# Patient Record
Sex: MALE | Race: WHITE | URBAN - METROPOLITAN AREA
[De-identification: names, ages, dates, MRNs, and addresses within clinical notes are randomized per-mention and may not be internally consistent; named-entity substitution may affect disease eponyms.]

---

## 2021-09-27 ENCOUNTER — LAB OUTSIDE AN ENCOUNTER (OUTPATIENT)
Dept: URBAN - METROPOLITAN AREA CLINIC 40 | Facility: CLINIC | Age: 46
End: 2021-09-27

## 2021-09-27 ENCOUNTER — WEB ENCOUNTER (OUTPATIENT)
Dept: URBAN - METROPOLITAN AREA CLINIC 40 | Facility: CLINIC | Age: 46
End: 2021-09-27

## 2021-09-27 ENCOUNTER — OFFICE VISIT (OUTPATIENT)
Dept: URBAN - METROPOLITAN AREA CLINIC 40 | Facility: CLINIC | Age: 46
End: 2021-09-27
Payer: COMMERCIAL

## 2021-09-27 DIAGNOSIS — K64.5 THROMBOSED HEMORRHOIDS: ICD-10-CM

## 2021-09-27 DIAGNOSIS — R19.5 MUCUS IN STOOL: ICD-10-CM

## 2021-09-27 DIAGNOSIS — R14.0 BLOATING: ICD-10-CM

## 2021-09-27 DIAGNOSIS — R19.7 DIARRHEA, UNSPECIFIED TYPE: ICD-10-CM

## 2021-09-27 PROCEDURE — 99214 OFFICE O/P EST MOD 30 MIN: CPT | Performed by: INTERNAL MEDICINE

## 2021-09-27 RX ORDER — NAPROXEN SODIUM 220 MG
TABLET ORAL
Qty: 0 | Refills: 0 | Status: ACTIVE | COMMUNITY
Start: 1900-01-01

## 2021-09-27 RX ORDER — DEXTROAMPHETAMINE SULFATE, DEXTROAMPHETAMINE SACCHARATE, AMPHETAMINE SULFATE AND AMPHETAMINE ASPARTATE 7.5; 7.5; 7.5; 7.5 MG/1; MG/1; MG/1; MG/1
CAPSULE, EXTENDED RELEASE ORAL
Qty: 0 | Refills: 0 | Status: ACTIVE | COMMUNITY
Start: 1900-01-01

## 2021-09-27 RX ORDER — HYDROCORTISONE ACETATE 25 MG/1
1 SUPPOSITORY SUPPOSITORY RECTAL TWICE A DAY
Qty: 20 | Refills: 2 | OUTPATIENT
Start: 2021-09-27 | End: 2021-10-27

## 2021-09-27 RX ORDER — DICYCLOMINE HYDROCHLORIDE 20 MG/1
1 TABLET TABLET ORAL THREE TIMES A DAY
Qty: 270 TABLET | Refills: 3 | OUTPATIENT
Start: 2021-09-27 | End: 2022-09-22

## 2021-09-27 RX ORDER — IBUPROFEN 200 MG/1
TABLET, COATED ORAL
Qty: 0 | Refills: 0 | Status: ACTIVE | COMMUNITY
Start: 1900-01-01

## 2021-09-27 NOTE — HPI-TODAY'S VISIT:
Patient presents for evaluation of acute abdominal pain.  He had colonoscopy 2019 that was negative for polyps and inflammatory bowel disease.  At that time he had some abdominal discomfort but HIDA scan and colonoscopy were otherwise normal.  He had hemorrhoid banding x3 in 2019 he is doing well in that regard.  CT scan 2019 at [facility] was negative, no acute abnormality, mild DJD noted in the back. Since our last visit he has noticed a few episodes of moderate abdominal cramping, mucus stools, fecal hurry and occasional diarrhea.  No specific alleviating or aggravating factors.  His wife who is a physician's assistant checked labs which included a negative stool culture, negative C. difficile, but there was an elevated fecal calprotectin at 285, his Aska was also positive on IBD panel.  He was then diagnosed with bronchiectasis from a pulmonologist.  There was question of whether or not he may have Crohn's disease.  In the meantime he had a thrombosed external hemorrhoid about a week ago which was large and tender to touch and hard to touch.  This is improved.  He does struggle with ongoing hemorrhoids with occasional white bleeding and pain itching and burning.

## 2021-09-27 NOTE — PHYSICAL EXAM GASTROINTESTINAL
Abdomen , soft, nontender, nondistended , no guarding or rigidity , no masses palpable , normal bowel sounds , Liver and Spleen , no hepatomegaly present , no hepatosplenomegaly , liver nontender , spleen not palpable , Rectal , resolving thrombosed hemorrhoid LL, normal sphincter tone , internal hemorrhoids, no rectal masses or bleeding present

## 2021-10-05 ENCOUNTER — WEB ENCOUNTER (OUTPATIENT)
Dept: URBAN - METROPOLITAN AREA CLINIC 40 | Facility: CLINIC | Age: 46
End: 2021-10-05

## 2021-10-06 ENCOUNTER — WEB ENCOUNTER (OUTPATIENT)
Dept: URBAN - METROPOLITAN AREA CLINIC 40 | Facility: CLINIC | Age: 46
End: 2021-10-06

## 2021-10-13 ENCOUNTER — WEB ENCOUNTER (OUTPATIENT)
Dept: URBAN - METROPOLITAN AREA CLINIC 40 | Facility: CLINIC | Age: 46
End: 2021-10-13

## 2021-10-25 ENCOUNTER — TELEPHONE ENCOUNTER (OUTPATIENT)
Dept: URBAN - METROPOLITAN AREA CLINIC 40 | Facility: CLINIC | Age: 46
End: 2021-10-25

## 2022-11-16 ENCOUNTER — OFFICE VISIT (OUTPATIENT)
Dept: URBAN - METROPOLITAN AREA CLINIC 74 | Facility: CLINIC | Age: 47
End: 2022-11-16
Payer: COMMERCIAL

## 2022-11-16 VITALS
HEART RATE: 103 BPM | OXYGEN SATURATION: 93 % | WEIGHT: 177.6 LBS | SYSTOLIC BLOOD PRESSURE: 128 MMHG | DIASTOLIC BLOOD PRESSURE: 82 MMHG | TEMPERATURE: 98.2 F | BODY MASS INDEX: 25.43 KG/M2 | HEIGHT: 70 IN

## 2022-11-16 DIAGNOSIS — K59.01 CONSTIPATION BY DELAYED COLONIC TRANSIT: ICD-10-CM

## 2022-11-16 DIAGNOSIS — R19.5 MUCUS IN STOOL: ICD-10-CM

## 2022-11-16 DIAGNOSIS — K64.1 GRADE II HEMORRHOIDS: ICD-10-CM

## 2022-11-16 DIAGNOSIS — K58.2 MIXED IRRITABLE BOWEL SYNDROME: ICD-10-CM

## 2022-11-16 DIAGNOSIS — R14.0 BLOATING: ICD-10-CM

## 2022-11-16 PROCEDURE — 99213 OFFICE O/P EST LOW 20 MIN: CPT | Performed by: INTERNAL MEDICINE

## 2022-11-16 RX ORDER — DICYCLOMINE HYDROCHLORIDE 20 MG/1
1 TABLET TABLET ORAL THREE TIMES A DAY
Qty: 270 TABLET | Refills: 3 | OUTPATIENT
Start: 2022-11-16 | End: 2023-11-11

## 2022-11-16 RX ORDER — IBUPROFEN 200 MG/1
TABLET, COATED ORAL
Qty: 0 | Refills: 0 | Status: ACTIVE | COMMUNITY
Start: 1900-01-01

## 2022-11-16 RX ORDER — LUBIPROSTONE 8 UG/1
1 CAPSULE WITH FOOD AND WATER CAPSULE, GELATIN COATED ORAL TWICE A DAY
Qty: 180 CAPSULE | Refills: 3 | OUTPATIENT
Start: 2022-11-16 | End: 2023-11-11

## 2022-11-16 RX ORDER — DEXTROAMPHETAMINE SULFATE, DEXTROAMPHETAMINE SACCHARATE, AMPHETAMINE SULFATE AND AMPHETAMINE ASPARTATE 7.5; 7.5; 7.5; 7.5 MG/1; MG/1; MG/1; MG/1
CAPSULE, EXTENDED RELEASE ORAL
Qty: 0 | Refills: 0 | Status: ACTIVE | COMMUNITY
Start: 1900-01-01

## 2022-11-16 RX ORDER — NAPROXEN SODIUM 220 MG
TABLET ORAL
Qty: 0 | Refills: 0 | Status: ACTIVE | COMMUNITY
Start: 1900-01-01

## 2022-11-16 NOTE — HPI-TODAY'S VISIT:
Patient presents for annual visit and evaluation and management of IBS and chronic intermittent abdominal pain. Since last visit he has ongoing constipation, straining to pass small stool, mucus in stools, frequent bloating and cramps. Linzess prn helps, but does cause diarrhea. He had additional stool tests done recently, but I do not have records to review. -MRe negative 2021. He had colonoscopy 2019 that was negative for polyps and inflammatory bowel disease.  At that time he had some abdominal discomfort but HIDA scan and colonoscopy were otherwise normal.  He had hemorrhoid banding x3 in 2019 he is doing well in that regard.  CT scan 2019 at [facility] was negative, no acute abnormality, mild DJD noted in the back. Since our last visit he has noticed a few episodes of moderate abdominal cramping, mucus stools, fecal hurry and occasional diarrhea.  No specific alleviating or aggravating factors.  His wife who is a physician's assistant checked labs which included a negative stool culture, negative C. difficile, but there was an elevated fecal calprotectin at 285, his Aska was also positive on IBD panel.  He was then diagnosed with bronchiectasis from a pulmonologist.  There was question of whether or not he may have Crohn's disease.  He does struggle with ongoing hemorrhoids with occasional white bleeding and pain itching and burning.  ===== EXAM:  AIC MRE (ABDOMEN AND PELVIS W+W/O IV CONTRAST)(CREATININE DRAW IF NEEDED)   CLINICAL INDICATION:  R14.0 (Abdominal distension (gaseous))    TECHNIQUE:  Multiplanar multisequence images were obtained through the abdomen and pelvis before and after IV administration of 8 mL of Multihance. Patient received oral contrast as well and was scanned utilizing MR enterography protocol.   COMPARISON:  No comparisons are available at this time.   FINDINGS:     MR ENTEROGRAPHY:    Bowel: There are no segments of bowel wall thickening or hyperenhancement to indicate active inflammatory bowel disease. No disproportionate dilation of the small or large bowel. There is no stricture, fistula, sinus tract, or abscess identified.  Motility appears normal.   REMAINDER OF THE ABDOMEN AND PELVIS:     Liver: Normal in size and configuration. No suspicious mass.   Bile ducts: No intrahepatic or extrahepatic bile duct dilation.   Gallbladder: No gallstones. Normal wall thickness.   Pancreas: Normal. No main pancreatic duct dilation.   Spleen: Normal.   Adrenals: Normal.   Kidneys: No suspicious renal mass. No hydronephrosis.   Bladder: Normal.   Reproductive organs: Normal size prostate. Symmetric seminal vesicles.   Peritoneum/Retroperitoneum: No ascites.   Lymph nodes: No enlarged lymph nodes.    Vasculature: No abdominal aortic aneurysm.   Bones: No suspicious osseous lesion.   IMPRESSION: .         .   No findings of active inflammatory bowel disease.   Released By: CASSIA SAVAGE MD 11/3/2021 12:25 PM

## 2023-01-23 ENCOUNTER — OFFICE VISIT (OUTPATIENT)
Dept: URBAN - METROPOLITAN AREA CLINIC 74 | Facility: CLINIC | Age: 48
End: 2023-01-23
Payer: COMMERCIAL

## 2023-01-23 ENCOUNTER — LAB OUTSIDE AN ENCOUNTER (OUTPATIENT)
Dept: URBAN - METROPOLITAN AREA CLINIC 74 | Facility: CLINIC | Age: 48
End: 2023-01-23

## 2023-01-23 VITALS
OXYGEN SATURATION: 100 % | SYSTOLIC BLOOD PRESSURE: 118 MMHG | TEMPERATURE: 97.9 F | BODY MASS INDEX: 25.4 KG/M2 | HEART RATE: 84 BPM | WEIGHT: 177.4 LBS | HEIGHT: 70 IN | DIASTOLIC BLOOD PRESSURE: 74 MMHG

## 2023-01-23 DIAGNOSIS — R19.5 MUCUS IN STOOL: ICD-10-CM

## 2023-01-23 DIAGNOSIS — K58.2 MIXED IRRITABLE BOWEL SYNDROME: ICD-10-CM

## 2023-01-23 DIAGNOSIS — K64.1 GRADE II HEMORRHOIDS: ICD-10-CM

## 2023-01-23 DIAGNOSIS — R14.0 BLOATING: ICD-10-CM

## 2023-01-23 DIAGNOSIS — K59.01 CONSTIPATION BY DELAYED COLONIC TRANSIT: ICD-10-CM

## 2023-01-23 PROCEDURE — 99214 OFFICE O/P EST MOD 30 MIN: CPT | Performed by: INTERNAL MEDICINE

## 2023-01-23 RX ORDER — LUBIPROSTONE 8 UG/1
1 CAPSULE WITH FOOD AND WATER CAPSULE, GELATIN COATED ORAL TWICE A DAY
Qty: 180 CAPSULE | Refills: 3 | OUTPATIENT

## 2023-01-23 RX ORDER — DICYCLOMINE HYDROCHLORIDE 20 MG/1
1 TABLET TABLET ORAL THREE TIMES A DAY
Qty: 270 TABLET | Refills: 3 | Status: ACTIVE | COMMUNITY
Start: 2022-11-16 | End: 2023-11-11

## 2023-01-23 RX ORDER — DEXTROAMPHETAMINE SULFATE, DEXTROAMPHETAMINE SACCHARATE, AMPHETAMINE SULFATE AND AMPHETAMINE ASPARTATE 7.5; 7.5; 7.5; 7.5 MG/1; MG/1; MG/1; MG/1
CAPSULE, EXTENDED RELEASE ORAL
Qty: 0 | Refills: 0 | Status: ACTIVE | COMMUNITY
Start: 1900-01-01

## 2023-01-23 RX ORDER — IBUPROFEN 200 MG/1
TABLET, COATED ORAL
Qty: 0 | Refills: 0 | Status: ACTIVE | COMMUNITY
Start: 1900-01-01

## 2023-01-23 RX ORDER — LUBIPROSTONE 8 UG/1
1 CAPSULE WITH FOOD AND WATER CAPSULE, GELATIN COATED ORAL TWICE A DAY
Qty: 180 CAPSULE | Refills: 3 | Status: ACTIVE | COMMUNITY
Start: 2022-11-16 | End: 2023-11-11

## 2023-01-23 RX ORDER — NAPROXEN SODIUM 220 MG
TABLET ORAL
Qty: 0 | Refills: 0 | Status: ACTIVE | COMMUNITY
Start: 1900-01-01

## 2023-01-23 RX ORDER — DICYCLOMINE HYDROCHLORIDE 20 MG/1
1 TABLET TABLET ORAL THREE TIMES A DAY
Qty: 270 TABLET | Refills: 3 | OUTPATIENT

## 2023-01-23 NOTE — HPI-TODAY'S VISIT:
Follow up visit.  Evaluation and management of suspected IBS and functional pain. Last visit: started Lubiprostone and Dicyclomine. Also, sample of Xifaxan. He c/o ongoing IBS s/s and bloating, no a/a factors, similar to prior. Hemorrhoids, flare, p/b/i. Linzess prn works, but he get diarrhea. Amitiza did not have response. He is concerned there is an infection of the gut.  Bloating often. No red flag s/s, no n/v.   -------- Prior note: Patient presents for annual visit and evaluation and management of IBS and chronic intermittent abdominal pain. Since last visit he has ongoing constipation, straining to pass small stool, mucus in stools, frequent bloating and cramps. Linzess prn helps, but does cause diarrhea. He had additional stool tests done recently, but I do not have records to review. -MRe negative 2021. He had colonoscopy 2019 that was negative for polyps and inflammatory bowel disease.  At that time he had some abdominal discomfort but HIDA scan and colonoscopy were otherwise normal.  He had hemorrhoid banding x3 in 2019 he is doing well in that regard.  CT scan 2019 at [facility] was negative, no acute abnormality, mild DJD noted in the back. Since our last visit he has noticed a few episodes of moderate abdominal cramping, mucus stools, fecal hurry and occasional diarrhea.  No specific alleviating or aggravating factors.  His wife who is a physician's assistant checked labs which included a negative stool culture, negative C. difficile, but there was an elevated fecal calprotectin at 285, his Aska was also positive on IBD panel.  He was then diagnosed with bronchiectasis from a pulmonologist.  There was question of whether or not he may have Crohn's disease.  He does struggle with ongoing hemorrhoids with occasional white bleeding and pain itching and burning.  ===== EXAM:  AIC MRE (ABDOMEN AND PELVIS W+W/O IV CONTRAST)(CREATININE DRAW IF NEEDED)   CLINICAL INDICATION:  R14.0 (Abdominal distension (gaseous))    TECHNIQUE:  Multiplanar multisequence images were obtained through the abdomen and pelvis before and after IV administration of 8 mL of Multihance. Patient received oral contrast as well and was scanned utilizing MR enterography protocol.   COMPARISON:  No comparisons are available at this time.   FINDINGS:     MR ENTEROGRAPHY:    Bowel: There are no segments of bowel wall thickening or hyperenhancement to indicate active inflammatory bowel disease. No disproportionate dilation of the small or large bowel. There is no stricture, fistula, sinus tract, or abscess identified.  Motility appears normal.   REMAINDER OF THE ABDOMEN AND PELVIS:     Liver: Normal in size and configuration. No suspicious mass.   Bile ducts: No intrahepatic or extrahepatic bile duct dilation.   Gallbladder: No gallstones. Normal wall thickness.   Pancreas: Normal. No main pancreatic duct dilation.   Spleen: Normal.   Adrenals: Normal.   Kidneys: No suspicious renal mass. No hydronephrosis.   Bladder: Normal.   Reproductive organs: Normal size prostate. Symmetric seminal vesicles.   Peritoneum/Retroperitoneum: No ascites.   Lymph nodes: No enlarged lymph nodes.    Vasculature: No abdominal aortic aneurysm.   Bones: No suspicious osseous lesion.   IMPRESSION: .         .   No findings of active inflammatory bowel disease.   Released By: CASSIA SAVAGE MD 11/3/2021 12:25 PM

## 2023-02-13 PROBLEM — 35298007: Status: ACTIVE | Noted: 2022-11-16

## 2023-02-13 PROBLEM — 440544005: Status: ACTIVE | Noted: 2022-11-16

## 2023-02-28 ENCOUNTER — OFFICE VISIT (OUTPATIENT)
Dept: URBAN - METROPOLITAN AREA SURGERY CENTER 30 | Facility: SURGERY CENTER | Age: 48
End: 2023-02-28
Payer: COMMERCIAL

## 2023-02-28 ENCOUNTER — CLAIMS CREATED FROM THE CLAIM WINDOW (OUTPATIENT)
Dept: URBAN - METROPOLITAN AREA CLINIC 4 | Facility: CLINIC | Age: 48
End: 2023-02-28
Payer: COMMERCIAL

## 2023-02-28 DIAGNOSIS — K52.3 COLITIS, INDETERMINATE: ICD-10-CM

## 2023-02-28 DIAGNOSIS — R10.84 ABDOMINAL CRAMPING, GENERALIZED: ICD-10-CM

## 2023-02-28 DIAGNOSIS — K31.89 OTHER DISEASES OF STOMACH AND DUODENUM: ICD-10-CM

## 2023-02-28 DIAGNOSIS — R12 BURNING REFLUX: ICD-10-CM

## 2023-02-28 PROCEDURE — 88305 TISSUE EXAM BY PATHOLOGIST: CPT | Performed by: PATHOLOGY

## 2023-02-28 PROCEDURE — 45380 COLONOSCOPY AND BIOPSY: CPT | Performed by: INTERNAL MEDICINE

## 2023-02-28 PROCEDURE — 43239 EGD BIOPSY SINGLE/MULTIPLE: CPT | Performed by: INTERNAL MEDICINE

## 2023-02-28 PROCEDURE — G8907 PT DOC NO EVENTS ON DISCHARG: HCPCS | Performed by: INTERNAL MEDICINE

## 2023-02-28 RX ORDER — IBUPROFEN 200 MG/1
TABLET, COATED ORAL
Qty: 0 | Refills: 0 | Status: ACTIVE | COMMUNITY
Start: 1900-01-01

## 2023-02-28 RX ORDER — DICYCLOMINE HYDROCHLORIDE 20 MG/1
1 TABLET TABLET ORAL THREE TIMES A DAY
Qty: 270 TABLET | Refills: 3 | Status: ACTIVE | COMMUNITY

## 2023-02-28 RX ORDER — MESALAMINE 1.2 G/1
4 TABLETS TABLET, DELAYED RELEASE ORAL ONCE A DAY
Qty: 120 TABLET | Refills: 5 | OUTPATIENT
Start: 2023-02-28 | End: 2023-08-27

## 2023-02-28 RX ORDER — LUBIPROSTONE 8 UG/1
1 CAPSULE WITH FOOD AND WATER CAPSULE, GELATIN COATED ORAL TWICE A DAY
Qty: 180 CAPSULE | Refills: 3 | Status: ACTIVE | COMMUNITY

## 2023-02-28 RX ORDER — DEXTROAMPHETAMINE SULFATE, DEXTROAMPHETAMINE SACCHARATE, AMPHETAMINE SULFATE AND AMPHETAMINE ASPARTATE 7.5; 7.5; 7.5; 7.5 MG/1; MG/1; MG/1; MG/1
CAPSULE, EXTENDED RELEASE ORAL
Qty: 0 | Refills: 0 | Status: ACTIVE | COMMUNITY
Start: 1900-01-01

## 2023-02-28 RX ORDER — NAPROXEN SODIUM 220 MG
TABLET ORAL
Qty: 0 | Refills: 0 | Status: ACTIVE | COMMUNITY
Start: 1900-01-01

## 2023-02-28 NOTE — HPI-TODAY'S VISIT:
Colonoscopy today confirmed sigmoid colitis. There is rectal sparring.  Prior IBD panel positive for ALCA (suggesting Crohn's) and negative for ASCA and ANCA. Fecal Calprotectin negative. Ileum normal. Small cecal patch of inflammation noted. EGD negative.

## 2023-03-22 ENCOUNTER — OFFICE VISIT (OUTPATIENT)
Dept: URBAN - METROPOLITAN AREA CLINIC 74 | Facility: CLINIC | Age: 48
End: 2023-03-22
Payer: COMMERCIAL

## 2023-03-22 VITALS
SYSTOLIC BLOOD PRESSURE: 132 MMHG | HEART RATE: 94 BPM | WEIGHT: 180.4 LBS | TEMPERATURE: 97.2 F | OXYGEN SATURATION: 99 % | BODY MASS INDEX: 25.83 KG/M2 | DIASTOLIC BLOOD PRESSURE: 80 MMHG | HEIGHT: 70 IN

## 2023-03-22 DIAGNOSIS — K52.9 COLITIS: ICD-10-CM

## 2023-03-22 PROCEDURE — 99213 OFFICE O/P EST LOW 20 MIN: CPT | Performed by: INTERNAL MEDICINE

## 2023-03-22 RX ORDER — DEXTROAMPHETAMINE SULFATE, DEXTROAMPHETAMINE SACCHARATE, AMPHETAMINE SULFATE AND AMPHETAMINE ASPARTATE 7.5; 7.5; 7.5; 7.5 MG/1; MG/1; MG/1; MG/1
CAPSULE, EXTENDED RELEASE ORAL
Qty: 0 | Refills: 0 | Status: ACTIVE | COMMUNITY
Start: 1900-01-01

## 2023-03-22 RX ORDER — NAPROXEN SODIUM 220 MG
TABLET ORAL
Qty: 0 | Refills: 0 | Status: ON HOLD | COMMUNITY
Start: 1900-01-01

## 2023-03-22 RX ORDER — DICYCLOMINE HYDROCHLORIDE 20 MG/1
1 TABLET TABLET ORAL THREE TIMES A DAY
Qty: 270 TABLET | Refills: 3 | Status: ON HOLD | COMMUNITY

## 2023-03-22 RX ORDER — MESALAMINE 1.2 G/1
4 TABLETS TABLET, DELAYED RELEASE ORAL ONCE A DAY
Qty: 120 TABLET | Refills: 5 | Status: ACTIVE | COMMUNITY
Start: 2023-02-28 | End: 2023-08-27

## 2023-03-22 RX ORDER — METHYLPREDNISOLONE 4 MG/1
AS DIRECTED TABLET ORAL ONCE A DAY
Qty: 21 | Refills: 0 | OUTPATIENT
Start: 2023-03-22 | End: 2023-03-28

## 2023-03-22 RX ORDER — LUBIPROSTONE 8 UG/1
1 CAPSULE WITH FOOD AND WATER CAPSULE, GELATIN COATED ORAL TWICE A DAY
Qty: 180 CAPSULE | Refills: 3 | Status: ON HOLD | COMMUNITY

## 2023-03-22 RX ORDER — IBUPROFEN 200 MG/1
TABLET, COATED ORAL
Qty: 0 | Refills: 0 | Status: ON HOLD | COMMUNITY
Start: 1900-01-01

## 2023-03-22 NOTE — HPI-TODAY'S VISIT:
Colonoscopy 2/2023 confirmed sigmoid colitis. There is rectal sparring. Bx positive for active colitis. Pt now on Mesalamine 4.8 g/day. Prior IBD panel positive for ALCA (suggesting Crohn's) and negative for ASCA and ANCA. Fecal Calprotectin negative. Ileum normal. Small cecal patch of inflammation noted. EGD negative.

## 2023-03-22 NOTE — PHYSICAL EXAM HENT:
Head, normocephalic, atraumatic, Face, Face within normal limits, Ears, External ears within normal limits Yes

## 2023-05-23 ENCOUNTER — WEB ENCOUNTER (OUTPATIENT)
Dept: URBAN - METROPOLITAN AREA CLINIC 74 | Facility: CLINIC | Age: 48
End: 2023-05-23

## 2023-05-23 RX ORDER — BUDESONIDE 3 MG/1
3 CAPSULE ORAL ONCE A DAY
Qty: 90 | Refills: 1 | OUTPATIENT
Start: 2023-05-26 | End: 2023-07-25

## 2023-05-26 PROBLEM — 50440006: Status: ACTIVE | Noted: 2023-05-26

## 2023-07-19 ENCOUNTER — OFFICE VISIT (OUTPATIENT)
Dept: URBAN - METROPOLITAN AREA CLINIC 74 | Facility: CLINIC | Age: 48
End: 2023-07-19
Payer: COMMERCIAL

## 2023-07-19 ENCOUNTER — TELEPHONE ENCOUNTER (OUTPATIENT)
Dept: URBAN - METROPOLITAN AREA CLINIC 74 | Facility: CLINIC | Age: 48
End: 2023-07-19

## 2023-07-19 VITALS
DIASTOLIC BLOOD PRESSURE: 78 MMHG | HEART RATE: 87 BPM | WEIGHT: 182.8 LBS | HEIGHT: 70 IN | TEMPERATURE: 96.9 F | SYSTOLIC BLOOD PRESSURE: 122 MMHG | OXYGEN SATURATION: 100 % | BODY MASS INDEX: 26.17 KG/M2

## 2023-07-19 DIAGNOSIS — K52.9 ACUTE AND CHRONIC COLITIS: ICD-10-CM

## 2023-07-19 PROCEDURE — 99214 OFFICE O/P EST MOD 30 MIN: CPT | Performed by: INTERNAL MEDICINE

## 2023-07-19 RX ORDER — NAPROXEN SODIUM 220 MG
TABLET ORAL
Qty: 0 | Refills: 0 | Status: ON HOLD | COMMUNITY
Start: 1900-01-01

## 2023-07-19 RX ORDER — OZANIMOD HYDROCHLORIDE 0.23-0.46
AS DIRECTED KIT ORAL DAILY
Qty: 1 KIT | Refills: 0 | OUTPATIENT
Start: 2023-07-19 | End: 2023-07-26

## 2023-07-19 RX ORDER — OZANIMOD HYDROCHLORIDE 0.92 MG/1
1 CAPSULE CAPSULE ORAL ONCE A DAY
Qty: 30 CAPSULE | Refills: 5 | OUTPATIENT
Start: 2023-07-19 | End: 2024-01-14

## 2023-07-19 RX ORDER — LUBIPROSTONE 8 UG/1
1 CAPSULE WITH FOOD AND WATER CAPSULE, GELATIN COATED ORAL TWICE A DAY
Qty: 180 CAPSULE | Refills: 3 | Status: ON HOLD | COMMUNITY

## 2023-07-19 RX ORDER — MESALAMINE 1.2 G/1
4 TABLETS TABLET, DELAYED RELEASE ORAL ONCE A DAY
Qty: 120 TABLET | Refills: 5 | Status: ACTIVE | COMMUNITY
Start: 2023-02-28 | End: 2023-08-27

## 2023-07-19 RX ORDER — DEXTROAMPHETAMINE SULFATE, DEXTROAMPHETAMINE SACCHARATE, AMPHETAMINE SULFATE AND AMPHETAMINE ASPARTATE 7.5; 7.5; 7.5; 7.5 MG/1; MG/1; MG/1; MG/1
CAPSULE, EXTENDED RELEASE ORAL
Qty: 0 | Refills: 0 | Status: ACTIVE | COMMUNITY
Start: 1900-01-01

## 2023-07-19 RX ORDER — IBUPROFEN 200 MG/1
TABLET, COATED ORAL
Qty: 0 | Refills: 0 | Status: ON HOLD | COMMUNITY
Start: 1900-01-01

## 2023-07-19 RX ORDER — DICYCLOMINE HYDROCHLORIDE 20 MG/1
1 TABLET TABLET ORAL THREE TIMES A DAY
Qty: 270 TABLET | Refills: 3 | Status: ON HOLD | COMMUNITY

## 2023-07-19 RX ORDER — BUDESONIDE 3 MG/1
3 CAPSULE ORAL ONCE A DAY
Qty: 90 | Refills: 1 | Status: ON HOLD | COMMUNITY
Start: 2023-05-26 | End: 2023-07-25

## 2023-07-19 NOTE — HPI-TODAY'S VISIT:
Colonoscopy 2/2023 confirmed sigmoid colitis. There is rectal sparring. Bx positive for active colitis. Pt now on Mesalamine 4.8 g/day. We added Entocort 9mg daily in April due to flare of s/s. But he DID NOT get the Rx. Still c/o bloating, cramping, mucus stools. No bleeding. Prior IBD panel positive for ALCA (suggesting Crohn's) and negative for ASCA and ANCA. Fecal Calprotectin negative. Ileum normal.   Small cecal patch of inflammation noted. EGD negative.

## 2023-07-26 ENCOUNTER — TELEPHONE ENCOUNTER (OUTPATIENT)
Dept: URBAN - METROPOLITAN AREA CLINIC 74 | Facility: CLINIC | Age: 48
End: 2023-07-26

## 2023-08-04 ENCOUNTER — TELEPHONE ENCOUNTER (OUTPATIENT)
Dept: URBAN - METROPOLITAN AREA CLINIC 74 | Facility: CLINIC | Age: 48
End: 2023-08-04

## 2023-08-07 ENCOUNTER — TELEPHONE ENCOUNTER (OUTPATIENT)
Dept: URBAN - METROPOLITAN AREA CLINIC 74 | Facility: CLINIC | Age: 48
End: 2023-08-07

## 2023-09-01 ENCOUNTER — TELEPHONE ENCOUNTER (OUTPATIENT)
Dept: URBAN - METROPOLITAN AREA CLINIC 74 | Facility: CLINIC | Age: 48
End: 2023-09-01

## 2023-09-01 RX ORDER — METHYLPREDNISOLONE 4 MG/1
AS DIRECTED TABLET ORAL ONCE A DAY
Qty: 21 | Refills: 1 | OUTPATIENT
Start: 2023-09-01 | End: 2023-09-13

## 2023-10-18 ENCOUNTER — OFFICE VISIT (OUTPATIENT)
Dept: URBAN - METROPOLITAN AREA CLINIC 74 | Facility: CLINIC | Age: 48
End: 2023-10-18
Payer: COMMERCIAL

## 2023-10-18 VITALS
TEMPERATURE: 96.7 F | OXYGEN SATURATION: 100 % | HEART RATE: 81 BPM | WEIGHT: 183.4 LBS | HEIGHT: 70 IN | BODY MASS INDEX: 26.26 KG/M2 | DIASTOLIC BLOOD PRESSURE: 68 MMHG | SYSTOLIC BLOOD PRESSURE: 124 MMHG

## 2023-10-18 DIAGNOSIS — K52.89 OTHER SPECIFIED NONINFECTIVE GASTROENTERITIS AND COLITIS: ICD-10-CM

## 2023-10-18 PROCEDURE — 99213 OFFICE O/P EST LOW 20 MIN: CPT | Performed by: INTERNAL MEDICINE

## 2023-10-18 RX ORDER — DEXTROAMPHETAMINE SULFATE, DEXTROAMPHETAMINE SACCHARATE, AMPHETAMINE SULFATE AND AMPHETAMINE ASPARTATE 7.5; 7.5; 7.5; 7.5 MG/1; MG/1; MG/1; MG/1
CAPSULE, EXTENDED RELEASE ORAL
Qty: 0 | Refills: 0 | Status: ACTIVE | COMMUNITY
Start: 1900-01-01

## 2023-10-18 RX ORDER — IBUPROFEN 200 MG/1
TABLET, COATED ORAL
Qty: 0 | Refills: 0 | Status: ON HOLD | COMMUNITY
Start: 1900-01-01

## 2023-10-18 RX ORDER — NAPROXEN SODIUM 220 MG
TABLET ORAL
Qty: 0 | Refills: 0 | Status: ON HOLD | COMMUNITY
Start: 1900-01-01

## 2023-10-18 RX ORDER — DICYCLOMINE HYDROCHLORIDE 20 MG/1
1 TABLET TABLET ORAL THREE TIMES A DAY
Qty: 270 TABLET | Refills: 3 | Status: ON HOLD | COMMUNITY

## 2023-10-18 RX ORDER — BUDESONIDE 3 MG/1
3 CAPSULES CAPSULE, DELAYED RELEASE PELLETS ORAL ONCE A DAY
Qty: 90 CAPSULE | Refills: 1 | OUTPATIENT
Start: 2023-10-18

## 2023-10-18 RX ORDER — OZANIMOD HYDROCHLORIDE 0.92 MG/1
1 CAPSULE CAPSULE ORAL ONCE A DAY
Qty: 30 CAPSULE | Refills: 5 | Status: ON HOLD | COMMUNITY
Start: 2023-07-19 | End: 2024-01-14

## 2023-10-18 RX ORDER — LUBIPROSTONE 8 UG/1
1 CAPSULE WITH FOOD AND WATER CAPSULE, GELATIN COATED ORAL TWICE A DAY
Qty: 180 CAPSULE | Refills: 3 | Status: ON HOLD | COMMUNITY

## 2023-10-18 NOTE — HPI-TODAY'S VISIT:
Colonoscopy 2/2023 confirmed sigmoid colitis. There is rectal sparring. Bx positive for active colitis. Pt now on Mesalamine 4.8 g/day. Started Lialda 4.8 grams/day and we considered Entocort 9mg daily but he did not get Rx, overall mild improvement and bleeding and tenesmus resolved, still bloating and cramping with small hard stools. Mucus stools. We had sent Rx for Zeposia but it was not approved. We added Entocort 9mg daily in April due to flare of s/s. But he DID NOT get the Rx. Still c/o bloating, cramping, mucus stools. No bleeding.  Prior IBD panel positive for ALCA (suggesting Crohn's) and negative for ASCA and ANCA. Fecal Calprotectin negative. Ileum normal.   Small cecal patch of inflammation noted. EGD negative.

## 2023-12-18 ENCOUNTER — DASHBOARD ENCOUNTERS (OUTPATIENT)
Age: 48
End: 2023-12-18

## 2023-12-18 ENCOUNTER — OFFICE VISIT (OUTPATIENT)
Dept: URBAN - METROPOLITAN AREA CLINIC 40 | Facility: CLINIC | Age: 48
End: 2023-12-18
Payer: COMMERCIAL

## 2023-12-18 VITALS
DIASTOLIC BLOOD PRESSURE: 84 MMHG | HEART RATE: 65 BPM | BODY MASS INDEX: 26.57 KG/M2 | HEIGHT: 70 IN | TEMPERATURE: 97.2 F | SYSTOLIC BLOOD PRESSURE: 126 MMHG | WEIGHT: 185.6 LBS

## 2023-12-18 DIAGNOSIS — R19.5 MUCUS IN STOOL: ICD-10-CM

## 2023-12-18 DIAGNOSIS — K58.2 MIXED IRRITABLE BOWEL SYNDROME: ICD-10-CM

## 2023-12-18 DIAGNOSIS — K52.9 COLITIS: ICD-10-CM

## 2023-12-18 PROCEDURE — 99214 OFFICE O/P EST MOD 30 MIN: CPT | Performed by: INTERNAL MEDICINE

## 2023-12-18 RX ORDER — DICYCLOMINE HYDROCHLORIDE 20 MG/1
1 TABLET TABLET ORAL THREE TIMES A DAY
Qty: 270 TABLET | Refills: 3 | Status: ON HOLD | COMMUNITY

## 2023-12-18 RX ORDER — AMITRIPTYLINE HYDROCHLORIDE 10 MG/1
1 TABLET AT BEDTIME TABLET, FILM COATED ORAL ONCE A DAY
Qty: 90 TABLET | Refills: 3 | OUTPATIENT
Start: 2023-12-18

## 2023-12-18 RX ORDER — IBUPROFEN 200 MG/1
TABLET, COATED ORAL
Qty: 0 | Refills: 0 | Status: ACTIVE | COMMUNITY
Start: 1900-01-01

## 2023-12-18 RX ORDER — NAPROXEN SODIUM 220 MG
TABLET ORAL
Qty: 0 | Refills: 0 | Status: ACTIVE | COMMUNITY
Start: 1900-01-01

## 2023-12-18 RX ORDER — BUDESONIDE 3 MG/1
2 CAPSULES CAPSULE, DELAYED RELEASE PELLETS ORAL ONCE A DAY
Qty: 60 CAPSULE | Refills: 1 | OUTPATIENT

## 2023-12-18 RX ORDER — BUDESONIDE 3 MG/1
3 CAPSULES CAPSULE, DELAYED RELEASE PELLETS ORAL ONCE A DAY
Qty: 90 CAPSULE | Refills: 1 | Status: ACTIVE | COMMUNITY
Start: 2023-10-18

## 2023-12-18 RX ORDER — OZANIMOD HYDROCHLORIDE 0.92 MG/1
1 CAPSULE CAPSULE ORAL ONCE A DAY
Qty: 30 CAPSULE | Refills: 5 | Status: ACTIVE | COMMUNITY
Start: 2023-07-19 | End: 2024-01-14

## 2023-12-18 RX ORDER — LUBIPROSTONE 8 UG/1
1 CAPSULE WITH FOOD AND WATER CAPSULE, GELATIN COATED ORAL TWICE A DAY
Qty: 180 CAPSULE | Refills: 3 | Status: ACTIVE | COMMUNITY

## 2023-12-18 RX ORDER — DEXTROAMPHETAMINE SULFATE, DEXTROAMPHETAMINE SACCHARATE, AMPHETAMINE SULFATE AND AMPHETAMINE ASPARTATE 7.5; 7.5; 7.5; 7.5 MG/1; MG/1; MG/1; MG/1
CAPSULE, EXTENDED RELEASE ORAL
Qty: 0 | Refills: 0 | Status: ACTIVE | COMMUNITY
Start: 1900-01-01

## 2023-12-18 NOTE — HPI-TODAY'S VISIT:
Follow up visit. Recent trial of Budesonide 9mg daily, now with 8 week follow up to review response.  -------- Prior note: Colonoscopy 2/2023 confirmed sigmoid colitis. There is rectal sparring. Bx positive for active colitis. Pt now on Mesalamine 4.8 g/day. Started Lialda 4.8 grams/day and we considered Entocort 9mg daily but he did not get Rx, overall mild improvement and bleeding and tenesmus resolved, still bloating and cramping with small hard stools. Mucus stools. We had sent Rx for Zeposia but it was not approved. We added Entocort 9mg daily in April due to flare of s/s. But he DID NOT get the Rx. Still c/o bloating, cramping, mucus stools. No bleeding.  Prior IBD panel positive for ALCA (suggesting Crohn's) and negative for ASCA and ANCA. Fecal Calprotectin negative. Ileum normal.   Small cecal patch of inflammation noted. EGD negative.

## 2023-12-20 ENCOUNTER — OFFICE VISIT (OUTPATIENT)
Dept: URBAN - METROPOLITAN AREA CLINIC 74 | Facility: CLINIC | Age: 48
End: 2023-12-20

## 2024-01-05 ENCOUNTER — TELEPHONE ENCOUNTER (OUTPATIENT)
Dept: URBAN - METROPOLITAN AREA CLINIC 40 | Facility: CLINIC | Age: 49
End: 2024-01-05

## 2024-01-05 RX ORDER — MESALAMINE 4 G/60ML
ONE 4GM KIT NIGHTLY SUSPENSION RECTAL DAILY
Qty: 14 KIT | Refills: 3 | OUTPATIENT
Start: 2024-01-05 | End: 2024-03-01

## 2024-06-17 ENCOUNTER — OFFICE VISIT (OUTPATIENT)
Dept: URBAN - METROPOLITAN AREA CLINIC 40 | Facility: CLINIC | Age: 49
End: 2024-06-17

## 2024-06-17 RX ORDER — BUDESONIDE 3 MG/1
2 CAPSULES CAPSULE, DELAYED RELEASE PELLETS ORAL ONCE A DAY
Qty: 60 CAPSULE | Refills: 1 | COMMUNITY

## 2024-06-17 RX ORDER — NAPROXEN SODIUM 220 MG
TABLET ORAL
Qty: 0 | Refills: 0 | COMMUNITY
Start: 1900-01-01

## 2024-06-17 RX ORDER — AMITRIPTYLINE HYDROCHLORIDE 10 MG/1
1 TABLET AT BEDTIME TABLET, FILM COATED ORAL ONCE A DAY
Qty: 90 TABLET | Refills: 3 | COMMUNITY
Start: 2023-12-18

## 2024-06-17 RX ORDER — DICYCLOMINE HYDROCHLORIDE 20 MG/1
1 TABLET TABLET ORAL THREE TIMES A DAY
Qty: 270 TABLET | Refills: 3 | COMMUNITY

## 2024-06-17 RX ORDER — IBUPROFEN 200 MG/1
TABLET, COATED ORAL
Qty: 0 | Refills: 0 | COMMUNITY
Start: 1900-01-01

## 2024-06-17 RX ORDER — DEXTROAMPHETAMINE SULFATE, DEXTROAMPHETAMINE SACCHARATE, AMPHETAMINE SULFATE AND AMPHETAMINE ASPARTATE 7.5; 7.5; 7.5; 7.5 MG/1; MG/1; MG/1; MG/1
CAPSULE, EXTENDED RELEASE ORAL
Qty: 0 | Refills: 0 | COMMUNITY
Start: 1900-01-01

## 2024-06-17 RX ORDER — LUBIPROSTONE 8 UG/1
1 CAPSULE WITH FOOD AND WATER CAPSULE, GELATIN COATED ORAL TWICE A DAY
Qty: 180 CAPSULE | Refills: 3 | COMMUNITY

## 2024-11-13 ENCOUNTER — OFFICE VISIT (OUTPATIENT)
Dept: URBAN - METROPOLITAN AREA CLINIC 74 | Facility: CLINIC | Age: 49
End: 2024-11-13
Payer: COMMERCIAL

## 2024-11-13 ENCOUNTER — LAB OUTSIDE AN ENCOUNTER (OUTPATIENT)
Dept: URBAN - METROPOLITAN AREA CLINIC 74 | Facility: CLINIC | Age: 49
End: 2024-11-13

## 2024-11-13 VITALS
BODY MASS INDEX: 26.92 KG/M2 | TEMPERATURE: 98.1 F | SYSTOLIC BLOOD PRESSURE: 136 MMHG | OXYGEN SATURATION: 98 % | HEART RATE: 88 BPM | HEIGHT: 70 IN | WEIGHT: 188 LBS | DIASTOLIC BLOOD PRESSURE: 78 MMHG

## 2024-11-13 DIAGNOSIS — R19.5 MUCUS IN STOOL: ICD-10-CM

## 2024-11-13 DIAGNOSIS — K58.2 MIXED IRRITABLE BOWEL SYNDROME: ICD-10-CM

## 2024-11-13 DIAGNOSIS — J47.9 BRONCHIECTASIS, UNCOMPLICATED: ICD-10-CM

## 2024-11-13 DIAGNOSIS — K52.9 COLITIS: ICD-10-CM

## 2024-11-13 PROBLEM — 12295008: Status: ACTIVE | Noted: 2024-11-13

## 2024-11-13 PROCEDURE — 99214 OFFICE O/P EST MOD 30 MIN: CPT | Performed by: INTERNAL MEDICINE

## 2024-11-13 RX ORDER — BUDESONIDE 3 MG/1
2 CAPSULES CAPSULE, DELAYED RELEASE PELLETS ORAL ONCE A DAY
Qty: 60 CAPSULE | Refills: 1 | Status: ON HOLD | COMMUNITY

## 2024-11-13 RX ORDER — DEXTROAMPHETAMINE SULFATE, DEXTROAMPHETAMINE SACCHARATE, AMPHETAMINE SULFATE AND AMPHETAMINE ASPARTATE 7.5; 7.5; 7.5; 7.5 MG/1; MG/1; MG/1; MG/1
CAPSULE, EXTENDED RELEASE ORAL
Qty: 0 | Refills: 0 | Status: ACTIVE | COMMUNITY
Start: 1900-01-01

## 2024-11-13 RX ORDER — IBUPROFEN 200 MG/1
TABLET, COATED ORAL
Qty: 0 | Refills: 0 | Status: ON HOLD | COMMUNITY
Start: 1900-01-01

## 2024-11-13 RX ORDER — MESALAMINE 1.2 G/1
4 TABLETS WITH MEAL TABLET, DELAYED RELEASE ORAL ONCE A DAY
Qty: 360 TABLET | Refills: 3 | OUTPATIENT
Start: 2024-11-13 | End: 2025-11-08

## 2024-11-13 RX ORDER — AMITRIPTYLINE HYDROCHLORIDE 10 MG/1
1 TABLET AT BEDTIME TABLET, FILM COATED ORAL ONCE A DAY
Qty: 90 TABLET | Refills: 3 | Status: ON HOLD | COMMUNITY
Start: 2023-12-18

## 2024-11-13 RX ORDER — DICYCLOMINE HYDROCHLORIDE 20 MG/1
1 TABLET TABLET ORAL THREE TIMES A DAY
Qty: 270 TABLET | Refills: 3 | Status: ON HOLD | COMMUNITY

## 2024-11-13 RX ORDER — LUBIPROSTONE 8 UG/1
1 CAPSULE WITH FOOD AND WATER CAPSULE, GELATIN COATED ORAL TWICE A DAY
Qty: 180 CAPSULE | Refills: 3 | Status: ON HOLD | COMMUNITY

## 2024-11-13 RX ORDER — NAPROXEN SODIUM 220 MG
TABLET ORAL
Qty: 0 | Refills: 0 | Status: ON HOLD | COMMUNITY
Start: 1900-01-01

## 2024-11-13 NOTE — HPI-TODAY'S VISIT:
Follow up visit, annual follow up for probable dx of colitis. 2023 visit with trial of Budesonide 9mg daily. Now off all IBD/GI meds. --Flare of mucus stools, straining to pass hard small stools, alternating diarrhea and constipation, bloating. RUQ cramps. He would like colonoscopy to rule out recurrent colitis. --Bronchiectasis flare. Seeing pulmonary at [place]. They think GERD is the cause, but pt denies overt GERD or any s/s of regurgitation.  - - - -  Prior note: Colonoscopy 2/2023 confirmed sigmoid colitis. There is rectal sparring. Bx positive for active colitis. Pt now on Mesalamine 4.8 g/day. Started Lialda 4.8 grams/day and we considered Entocort 9mg daily but he did not get Rx, overall mild improvement and bleeding and tenesmus resolved, still bloating and cramping with small hard stools. Mucus stools. We had sent Rx for Zeposia but it was not approved. We added Entocort 9mg daily in April due to flare of s/s. But he DID NOT get the Rx. Still c/o bloating, cramping, mucus stools. No bleeding.  Prior IBD panel positive for ALCA (suggesting Crohn's) and negative for ASCA and ANCA. Fecal Calprotectin negative. Ileum normal.   Small cecal patch of inflammation noted. EGD negative.

## 2024-12-17 ENCOUNTER — CLAIMS CREATED FROM THE CLAIM WINDOW (OUTPATIENT)
Dept: URBAN - METROPOLITAN AREA SURGERY CENTER 30 | Facility: SURGERY CENTER | Age: 49
End: 2024-12-17
Payer: COMMERCIAL

## 2024-12-17 ENCOUNTER — CLAIMS CREATED FROM THE CLAIM WINDOW (OUTPATIENT)
Dept: URBAN - METROPOLITAN AREA CLINIC 4 | Facility: CLINIC | Age: 49
End: 2024-12-17
Payer: COMMERCIAL

## 2024-12-17 DIAGNOSIS — K51.50 ACUTE LEFT-SIDED ULCERATIVE COLITIS: ICD-10-CM

## 2024-12-17 DIAGNOSIS — K64.1 BLEEDING GRADE II HEMORRHOIDS: ICD-10-CM

## 2024-12-17 DIAGNOSIS — K63.89 OTHER SPECIFIED DISEASES OF INTESTINE: ICD-10-CM

## 2024-12-17 DIAGNOSIS — K51.80 OTHER ULCERATIVE COLITIS WITHOUT COMPLICATIONS: ICD-10-CM

## 2024-12-17 DIAGNOSIS — Z09 ENCOUNTER FOR FOLLOW-UP EXAMINATION AFTER COMPLETED TREATMENT FOR CONDITIONS OTHER THAN MALIGNANT NEOPLASM: ICD-10-CM

## 2024-12-17 DIAGNOSIS — Z87.19 PERSONAL HISTORY OF OTHER DISEASES OF THE DIGESTIVE SYSTEM: ICD-10-CM

## 2024-12-17 PROCEDURE — 46221 LIGATION OF HEMORRHOID(S): CPT | Performed by: INTERNAL MEDICINE

## 2024-12-17 PROCEDURE — 45380 COLONOSCOPY AND BIOPSY: CPT | Performed by: INTERNAL MEDICINE

## 2024-12-17 PROCEDURE — 88305 TISSUE EXAM BY PATHOLOGIST: CPT | Performed by: PATHOLOGY

## 2024-12-17 PROCEDURE — 00812 ANES LWR INTST SCR COLSC: CPT | Performed by: NURSE ANESTHETIST, CERTIFIED REGISTERED

## 2024-12-17 RX ORDER — DEXTROAMPHETAMINE SULFATE, DEXTROAMPHETAMINE SACCHARATE, AMPHETAMINE SULFATE AND AMPHETAMINE ASPARTATE 7.5; 7.5; 7.5; 7.5 MG/1; MG/1; MG/1; MG/1
CAPSULE, EXTENDED RELEASE ORAL
Qty: 0 | Refills: 0 | Status: ACTIVE | COMMUNITY
Start: 1900-01-01

## 2024-12-17 RX ORDER — BUDESONIDE 3 MG/1
2 CAPSULES CAPSULE, DELAYED RELEASE PELLETS ORAL ONCE A DAY
Qty: 60 CAPSULE | Refills: 1 | Status: ON HOLD | COMMUNITY

## 2024-12-17 RX ORDER — LUBIPROSTONE 8 UG/1
1 CAPSULE WITH FOOD AND WATER CAPSULE, GELATIN COATED ORAL TWICE A DAY
Qty: 180 CAPSULE | Refills: 3 | Status: ON HOLD | COMMUNITY

## 2024-12-17 RX ORDER — AMITRIPTYLINE HYDROCHLORIDE 10 MG/1
1 TABLET AT BEDTIME TABLET, FILM COATED ORAL ONCE A DAY
Qty: 90 TABLET | Refills: 3 | Status: ON HOLD | COMMUNITY
Start: 2023-12-18

## 2024-12-17 RX ORDER — MESALAMINE 1.2 G/1
4 TABLETS WITH MEAL TABLET, DELAYED RELEASE ORAL ONCE A DAY
Qty: 360 TABLET | Refills: 3 | Status: ACTIVE | COMMUNITY
Start: 2024-11-13 | End: 2025-11-08

## 2024-12-17 RX ORDER — NAPROXEN SODIUM 220 MG
TABLET ORAL
Qty: 0 | Refills: 0 | Status: ON HOLD | COMMUNITY
Start: 1900-01-01

## 2024-12-17 RX ORDER — DICYCLOMINE HYDROCHLORIDE 20 MG/1
1 TABLET TABLET ORAL THREE TIMES A DAY
Qty: 270 TABLET | Refills: 3 | Status: ON HOLD | COMMUNITY

## 2024-12-17 RX ORDER — IBUPROFEN 200 MG/1
TABLET, COATED ORAL
Qty: 0 | Refills: 0 | Status: ON HOLD | COMMUNITY
Start: 1900-01-01

## 2025-01-14 ENCOUNTER — OFFICE VISIT (OUTPATIENT)
Dept: URBAN - METROPOLITAN AREA SURGERY CENTER 30 | Facility: SURGERY CENTER | Age: 50
End: 2025-01-14

## 2025-02-10 ENCOUNTER — LAB OUTSIDE AN ENCOUNTER (OUTPATIENT)
Dept: URBAN - METROPOLITAN AREA CLINIC 74 | Facility: CLINIC | Age: 50
End: 2025-02-10

## 2025-02-10 ENCOUNTER — OFFICE VISIT (OUTPATIENT)
Dept: URBAN - METROPOLITAN AREA CLINIC 74 | Facility: CLINIC | Age: 50
End: 2025-02-10
Payer: COMMERCIAL

## 2025-02-10 VITALS
DIASTOLIC BLOOD PRESSURE: 78 MMHG | TEMPERATURE: 97.8 F | SYSTOLIC BLOOD PRESSURE: 124 MMHG | OXYGEN SATURATION: 99 % | HEIGHT: 70 IN | BODY MASS INDEX: 28.06 KG/M2 | HEART RATE: 86 BPM | WEIGHT: 196 LBS

## 2025-02-10 DIAGNOSIS — R10.11 RIGHT UPPER QUADRANT PAIN: ICD-10-CM

## 2025-02-10 DIAGNOSIS — K52.9 COLITIS: ICD-10-CM

## 2025-02-10 DIAGNOSIS — K58.2 MIXED IRRITABLE BOWEL SYNDROME: ICD-10-CM

## 2025-02-10 DIAGNOSIS — J47.9 BRONCHIECTASIS, UNCOMPLICATED: ICD-10-CM

## 2025-02-10 PROCEDURE — 99214 OFFICE O/P EST MOD 30 MIN: CPT

## 2025-02-10 RX ORDER — BUDESONIDE 3 MG/1
3 CAPSULES CAPSULE, DELAYED RELEASE PELLETS ORAL ONCE A DAY
Qty: 168 CAPSULE | Refills: 0 | OUTPATIENT
Start: 2025-02-10

## 2025-02-10 RX ORDER — IBUPROFEN 200 MG/1
TABLET, COATED ORAL
Qty: 0 | Refills: 0 | Status: ON HOLD | COMMUNITY
Start: 1900-01-01

## 2025-02-10 RX ORDER — MESALAMINE 1.2 G/1
4 TABLETS WITH MEAL TABLET, DELAYED RELEASE ORAL ONCE A DAY
Qty: 360 TABLET | Refills: 3 | Status: ACTIVE | COMMUNITY
Start: 2024-11-13 | End: 2025-11-08

## 2025-02-10 RX ORDER — DICYCLOMINE HYDROCHLORIDE 20 MG/1
1 TABLET TABLET ORAL THREE TIMES A DAY
Qty: 270 TABLET | Refills: 3 | Status: ON HOLD | COMMUNITY

## 2025-02-10 RX ORDER — AMITRIPTYLINE HYDROCHLORIDE 10 MG/1
1 TABLET AT BEDTIME TABLET, FILM COATED ORAL ONCE A DAY
Qty: 90 TABLET | Refills: 3 | Status: ON HOLD | COMMUNITY
Start: 2023-12-18

## 2025-02-10 RX ORDER — BUDESONIDE 3 MG/1
2 CAPSULES CAPSULE, DELAYED RELEASE PELLETS ORAL ONCE A DAY
Qty: 60 CAPSULE | Refills: 1 | Status: ON HOLD | COMMUNITY

## 2025-02-10 RX ORDER — NAPROXEN SODIUM 220 MG
TABLET ORAL
Qty: 0 | Refills: 0 | Status: ON HOLD | COMMUNITY
Start: 1900-01-01

## 2025-02-10 RX ORDER — DEXTROAMPHETAMINE SULFATE, DEXTROAMPHETAMINE SACCHARATE, AMPHETAMINE SULFATE AND AMPHETAMINE ASPARTATE 7.5; 7.5; 7.5; 7.5 MG/1; MG/1; MG/1; MG/1
CAPSULE, EXTENDED RELEASE ORAL
Qty: 0 | Refills: 0 | Status: ACTIVE | COMMUNITY
Start: 1900-01-01

## 2025-02-10 RX ORDER — LUBIPROSTONE 8 UG/1
1 CAPSULE WITH FOOD AND WATER CAPSULE, GELATIN COATED ORAL TWICE A DAY
Qty: 180 CAPSULE | Refills: 3 | Status: ON HOLD | COMMUNITY

## 2025-02-10 NOTE — HPI-TODAY'S VISIT:
Follow-up visit, colonoscopy follow up. Known to Dr. Cabral.  He reports worsening of symptoms since colonoscopy. Has been taking Lialda. He reports continued generalized abdominal discomfort/bloating, RUQ pain, difficulty passing stools, occasional BRBPR. He did have normal HIDA scan in 2019. No nausea, vomiting, GERD sx, weight loss. He takes Linzess 145mcg prn with improvement in BMs. Did not notice any difference with fiber supplements.   Previously --Flare of mucus stools, straining to pass hard small stools, alternating diarrhea and constipation, bloating. RUQ cramps. Colonoscopy was planned, results below, and started on Lialda 4.5mg daily.  --Bronchiectasis flare. Seeing pulmonary at [place]. They think GERD is the cause, but pt denies overt GERD or any s/s of regurgitation. Given 15 days course of Voquezna.  -- Colonoscopy 12/17/2024, Dr. Cabral: The entire examined colon is normal. Biopsies report right side with no significant abnormality and left side with patchy mild chronic inactive colitis, consistent with quiescent ulcerative colitis. No signs of colitis on this exam. The examined portion of the ileum was normal. Grade II internal hemorrhoids, banded. Repeat in 5 years.    - - - -  Prior note: Colonoscopy 2/2023 confirmed sigmoid colitis. There is rectal sparring. Bx positive for active colitis. Pt now on Mesalamine 4.8 g/day. Started Lialda 4.8 grams/dayand we considered Entocort 9mg dailybut he did not get Rx, overall mild improvement and bleeding and tenesmus resolved, still bloating and cramping with small hard stools. Mucus stools. We had sent Rx for Zeposiabut it was not approved. We added Entocort 9mg daily in April due to flare of s/s. But he DID NOT get the Rx. Still c/o bloating, cramping, mucus stools. No bleeding.  Prior IBD panel positive for ALCA (suggesting Crohn's) and negative for ASCA and ANCA. Fecal Calprotectin negative. Ileum normal.   Small cecal patch of inflammation noted. EGD negative.

## 2025-04-07 ENCOUNTER — OFFICE VISIT (OUTPATIENT)
Dept: URBAN - METROPOLITAN AREA CLINIC 74 | Facility: CLINIC | Age: 50
End: 2025-04-07

## 2025-08-20 ENCOUNTER — OFFICE VISIT (OUTPATIENT)
Dept: URBAN - METROPOLITAN AREA CLINIC 74 | Facility: CLINIC | Age: 50
End: 2025-08-20
Payer: COMMERCIAL

## 2025-08-20 DIAGNOSIS — R10.11 RIGHT UPPER QUADRANT PAIN: ICD-10-CM

## 2025-08-20 DIAGNOSIS — K52.9 COLITIS: ICD-10-CM

## 2025-08-20 DIAGNOSIS — K58.2 MIXED IRRITABLE BOWEL SYNDROME: ICD-10-CM

## 2025-08-20 DIAGNOSIS — J47.9 BRONCHIECTASIS, UNCOMPLICATED: ICD-10-CM

## 2025-08-20 PROCEDURE — 99214 OFFICE O/P EST MOD 30 MIN: CPT | Performed by: INTERNAL MEDICINE

## 2025-08-20 RX ORDER — LUBIPROSTONE 8 UG/1
1 CAPSULE WITH FOOD AND WATER CAPSULE, GELATIN COATED ORAL TWICE A DAY
Qty: 180 CAPSULE | Refills: 3 | Status: ON HOLD | COMMUNITY

## 2025-08-20 RX ORDER — BUDESONIDE 3 MG/1
3 CAPSULES CAPSULE, DELAYED RELEASE PELLETS ORAL ONCE A DAY
Qty: 168 CAPSULE | Refills: 0 | Status: ON HOLD | COMMUNITY
Start: 2025-02-10

## 2025-08-20 RX ORDER — DICYCLOMINE HYDROCHLORIDE 20 MG/1
1 TABLET TABLET ORAL THREE TIMES A DAY
Qty: 270 TABLET | Refills: 3 | Status: ON HOLD | COMMUNITY

## 2025-08-20 RX ORDER — BUDESONIDE 3 MG/1
2 CAPSULES CAPSULE, DELAYED RELEASE PELLETS ORAL ONCE A DAY
Qty: 60 CAPSULE | Refills: 1 | Status: ON HOLD | COMMUNITY

## 2025-08-20 RX ORDER — AMITRIPTYLINE HYDROCHLORIDE 10 MG/1
1 TABLET AT BEDTIME TABLET, FILM COATED ORAL ONCE A DAY
Qty: 90 TABLET | Refills: 3 | Status: ON HOLD | COMMUNITY
Start: 2023-12-18

## 2025-08-20 RX ORDER — HYDROCORTISONE ACETATE 25 MG/1
1 SUPPOSITORY SUPPOSITORY RECTAL TWICE A DAY
Qty: 60 | Refills: 0 | OUTPATIENT
Start: 2025-08-20 | End: 2025-09-19

## 2025-08-20 RX ORDER — MESALAMINE 4 G/60ML
AS DIRECTED SUSPENSION RECTAL TWICE A DAY
Qty: 60 | Refills: 1 | OUTPATIENT
Start: 2025-08-20

## 2025-08-20 RX ORDER — MESALAMINE 1.2 G/1
4 TABLETS WITH MEAL TABLET, DELAYED RELEASE ORAL ONCE A DAY
Qty: 360 TABLET | Refills: 3 | Status: ON HOLD | COMMUNITY
Start: 2024-11-13 | End: 2025-11-08

## 2025-08-20 RX ORDER — NAPROXEN SODIUM 220 MG
TABLET ORAL
Qty: 0 | Refills: 0 | Status: ON HOLD | COMMUNITY
Start: 1900-01-01

## 2025-08-20 RX ORDER — IBUPROFEN 200 MG/1
TABLET, COATED ORAL
Qty: 0 | Refills: 0 | Status: ON HOLD | COMMUNITY
Start: 1900-01-01

## 2025-08-20 RX ORDER — DEXTROAMPHETAMINE SULFATE, DEXTROAMPHETAMINE SACCHARATE, AMPHETAMINE SULFATE AND AMPHETAMINE ASPARTATE 7.5; 7.5; 7.5; 7.5 MG/1; MG/1; MG/1; MG/1
CAPSULE, EXTENDED RELEASE ORAL
Qty: 0 | Refills: 0 | Status: ACTIVE | COMMUNITY
Start: 1900-01-01

## 2025-08-25 ENCOUNTER — WEB ENCOUNTER (OUTPATIENT)
Dept: URBAN - METROPOLITAN AREA CLINIC 74 | Facility: CLINIC | Age: 50
End: 2025-08-25

## 2025-08-25 RX ORDER — MESALAMINE 1.2 G/1
4 TABLETS WITH MEAL TABLET, DELAYED RELEASE ORAL ONCE A DAY
Qty: 360 TABLET | Refills: 3
Start: 2024-11-13

## 2025-08-27 ENCOUNTER — TELEPHONE ENCOUNTER (OUTPATIENT)
Dept: URBAN - METROPOLITAN AREA CLINIC 40 | Facility: CLINIC | Age: 50
End: 2025-08-27